# Patient Record
Sex: MALE | Race: WHITE | NOT HISPANIC OR LATINO | Employment: FULL TIME | URBAN - METROPOLITAN AREA
[De-identification: names, ages, dates, MRNs, and addresses within clinical notes are randomized per-mention and may not be internally consistent; named-entity substitution may affect disease eponyms.]

---

## 2023-12-22 ENCOUNTER — OFFICE VISIT (OUTPATIENT)
Dept: PRIMARY CARE | Facility: CLINIC | Age: 33
End: 2023-12-22
Payer: COMMERCIAL

## 2023-12-22 VITALS
BODY MASS INDEX: 33.32 KG/M2 | DIASTOLIC BLOOD PRESSURE: 88 MMHG | HEART RATE: 78 BPM | TEMPERATURE: 98.5 F | HEIGHT: 72 IN | WEIGHT: 246 LBS | SYSTOLIC BLOOD PRESSURE: 122 MMHG

## 2023-12-22 DIAGNOSIS — I27.0 PRIMARY PULMONARY HTN (MULTI): Primary | ICD-10-CM

## 2023-12-22 DIAGNOSIS — Z13.220 SCREENING FOR LIPID DISORDERS: ICD-10-CM

## 2023-12-22 PROCEDURE — 3008F BODY MASS INDEX DOCD: CPT | Performed by: INTERNAL MEDICINE

## 2023-12-22 PROCEDURE — 1036F TOBACCO NON-USER: CPT | Performed by: INTERNAL MEDICINE

## 2023-12-22 PROCEDURE — 99203 OFFICE O/P NEW LOW 30 MIN: CPT | Performed by: INTERNAL MEDICINE

## 2023-12-22 ASSESSMENT — PATIENT HEALTH QUESTIONNAIRE - PHQ9
2. FEELING DOWN, DEPRESSED OR HOPELESS: NOT AT ALL
SUM OF ALL RESPONSES TO PHQ9 QUESTIONS 1 & 2: 0
1. LITTLE INTEREST OR PLEASURE IN DOING THINGS: NOT AT ALL

## 2023-12-22 ASSESSMENT — ENCOUNTER SYMPTOMS
OCCASIONAL FEELINGS OF UNSTEADINESS: 0
DEPRESSION: 0
LOSS OF SENSATION IN FEET: 0

## 2023-12-22 NOTE — PROGRESS NOTES
Patient is seen for the first time to establish care.  His main concern is elevated blood pressure readings.  He informs that he was on amlodipine on for some time.  His blood pressure improved and he has stopped the amlodipine.    Past medical history:  1.  Hypertension  2.  Overweight.    Social history: He is a former smoker.  Stopped smoking 6 years ago before that he has smoked half pack a day for 40 years.  Also used nicotine vapor for some time which she has stopped 6 months ago.  He drinks occasionally.  He is  with 2 children.  He works as a  in the company.  He does not exercise regularly.    Family history: Positive for prostate cancer in father.  Mother is healthy.  There is no history of premature coronary artery disease    Review of other system: He denies any fever, chill, anorexia.  He has no visual disturbances.  Has few episodes of headache.  Denies any chest pain or palpitation.  Has no shortness of breath or wheezing.  Denies any nausea matting pain abdomen.  He has no urinary symptoms.  Has no numbness, tingling weakness of extremities.  Denies any joint swelling or pain.  He has no symptom of major depression.  Review of other systems are negative.    On examination:  General examination: There is no acute discomfort his BMI is 33.3 today  Eyes: There is no pallor or jaundice.  Pupils are equal and reactive to light  Oral cavity throat normal  Neck: There is no carotid bruits or JVD  Lungs: Clear to auscultation  CVS: Heart sounds are regular there is no gallop murmur  Abdomen: Normal exam  CNS: Normal power  Legs: No edema    Assessment and plan  1.  Hypertension: Blood pressure readings are good today lifestyle modifications including cutting down on salt and saturated fat is discussed with the patient encouraged to do regular exercise and lose some weight.  He is advised to check his blood pressure morning evening for about a week and call our office with the  readings.  Will consider in resuming medications if needed.  Goals of treatment are discussed with the patient  2.  Overweight: Goals of treatment discussed with the patient encouraged to cut down calorie intake and excise regularly  3.  Health maintenance: I will check his lipid profile.  He is average risk for colon cancer and is a screening will start at the appropriate age.  He does not want flu vaccination.  AAA I will bring him back in a few weeks time for further discussion after we have the results of blood test.

## 2024-01-12 PROBLEM — Z20.822 SUSPECTED SEVERE ACUTE RESPIRATORY SYNDROME CORONAVIRUS 2 (SARS-COV-2) INFECTION: Status: ACTIVE | Noted: 2024-01-12

## 2024-01-19 ENCOUNTER — LAB (OUTPATIENT)
Dept: LAB | Facility: LAB | Age: 34
End: 2024-01-19
Payer: COMMERCIAL

## 2024-01-19 ENCOUNTER — APPOINTMENT (OUTPATIENT)
Dept: PRIMARY CARE | Facility: CLINIC | Age: 34
End: 2024-01-19
Payer: COMMERCIAL

## 2024-01-19 DIAGNOSIS — Z13.220 SCREENING FOR LIPID DISORDERS: ICD-10-CM

## 2024-01-19 DIAGNOSIS — I27.0 PRIMARY PULMONARY HTN (MULTI): ICD-10-CM

## 2024-01-19 LAB
ALBUMIN SERPL BCP-MCNC: 4.9 G/DL (ref 3.4–5)
ALP SERPL-CCNC: 58 U/L (ref 33–120)
ALT SERPL W P-5'-P-CCNC: 56 U/L (ref 10–52)
ANION GAP SERPL CALC-SCNC: 14 MMOL/L (ref 10–20)
AST SERPL W P-5'-P-CCNC: 27 U/L (ref 9–39)
BASOPHILS # BLD AUTO: 0.04 X10*3/UL (ref 0–0.1)
BASOPHILS NFR BLD AUTO: 0.5 %
BILIRUB SERPL-MCNC: 0.5 MG/DL (ref 0–1.2)
BUN SERPL-MCNC: 15 MG/DL (ref 6–23)
CALCIUM SERPL-MCNC: 10 MG/DL (ref 8.6–10.3)
CHLORIDE SERPL-SCNC: 101 MMOL/L (ref 98–107)
CHOLEST SERPL-MCNC: 185 MG/DL (ref 0–199)
CHOLESTEROL/HDL RATIO: 4.2
CO2 SERPL-SCNC: 30 MMOL/L (ref 21–32)
CREAT SERPL-MCNC: 1 MG/DL (ref 0.5–1.3)
EGFRCR SERPLBLD CKD-EPI 2021: >90 ML/MIN/1.73M*2
EOSINOPHIL # BLD AUTO: 0.08 X10*3/UL (ref 0–0.7)
EOSINOPHIL NFR BLD AUTO: 1 %
ERYTHROCYTE [DISTWIDTH] IN BLOOD BY AUTOMATED COUNT: 13.2 % (ref 11.5–14.5)
GLUCOSE SERPL-MCNC: 101 MG/DL (ref 74–99)
HCT VFR BLD AUTO: 48.4 % (ref 41–52)
HDLC SERPL-MCNC: 43.8 MG/DL
HGB BLD-MCNC: 16.6 G/DL (ref 13.5–17.5)
IMM GRANULOCYTES # BLD AUTO: 0.02 X10*3/UL (ref 0–0.7)
IMM GRANULOCYTES NFR BLD AUTO: 0.3 % (ref 0–0.9)
LDLC SERPL CALC-MCNC: 114 MG/DL
LYMPHOCYTES # BLD AUTO: 1.24 X10*3/UL (ref 1.2–4.8)
LYMPHOCYTES NFR BLD AUTO: 15.9 %
MCH RBC QN AUTO: 29.9 PG (ref 26–34)
MCHC RBC AUTO-ENTMCNC: 34.3 G/DL (ref 32–36)
MCV RBC AUTO: 87 FL (ref 80–100)
MONOCYTES # BLD AUTO: 0.48 X10*3/UL (ref 0.1–1)
MONOCYTES NFR BLD AUTO: 6.2 %
NEUTROPHILS # BLD AUTO: 5.93 X10*3/UL (ref 1.2–7.7)
NEUTROPHILS NFR BLD AUTO: 76.1 %
NON HDL CHOLESTEROL: 141 MG/DL (ref 0–149)
NRBC BLD-RTO: 0 /100 WBCS (ref 0–0)
PLATELET # BLD AUTO: 228 X10*3/UL (ref 150–450)
POTASSIUM SERPL-SCNC: 4.5 MMOL/L (ref 3.5–5.3)
PROT SERPL-MCNC: 7.5 G/DL (ref 6.4–8.2)
RBC # BLD AUTO: 5.55 X10*6/UL (ref 4.5–5.9)
SODIUM SERPL-SCNC: 140 MMOL/L (ref 136–145)
TRIGL SERPL-MCNC: 137 MG/DL (ref 0–149)
VLDL: 27 MG/DL (ref 0–40)
WBC # BLD AUTO: 7.8 X10*3/UL (ref 4.4–11.3)

## 2024-01-19 PROCEDURE — 80061 LIPID PANEL: CPT

## 2024-01-19 PROCEDURE — 80053 COMPREHEN METABOLIC PANEL: CPT

## 2024-01-19 PROCEDURE — 36415 COLL VENOUS BLD VENIPUNCTURE: CPT

## 2024-01-19 PROCEDURE — 85025 COMPLETE CBC W/AUTO DIFF WBC: CPT

## 2024-01-22 ENCOUNTER — TELEPHONE (OUTPATIENT)
Dept: PRIMARY CARE | Facility: CLINIC | Age: 34
End: 2024-01-22
Payer: COMMERCIAL

## 2024-01-22 NOTE — TELEPHONE ENCOUNTER
Left pt a message   ----- Message from Niyah Richards MD sent at 1/19/2024  4:40 PM EST -----  Cholesterol level is high.  Liver function test is abnormal.  Needs to be seen to discuss treatment options  ----- Message -----  From: Lab, Background User  Sent: 1/19/2024   1:28 PM EST  To: Niyah Richards MD

## 2024-01-22 NOTE — TELEPHONE ENCOUNTER
Left message for pt.     ----- Message from Niyah Richards MD sent at 1/19/2024  4:40 PM EST -----  Cholesterol level is high.  Liver function test is abnormal.  Needs to be seen to discuss treatment options  ----- Message -----  From: Lab, Background User  Sent: 1/19/2024   1:28 PM EST  To: Niyah Richards MD

## 2024-01-26 ENCOUNTER — OFFICE VISIT (OUTPATIENT)
Dept: PRIMARY CARE | Facility: CLINIC | Age: 34
End: 2024-01-26
Payer: COMMERCIAL

## 2024-01-26 VITALS
HEART RATE: 89 BPM | SYSTOLIC BLOOD PRESSURE: 150 MMHG | DIASTOLIC BLOOD PRESSURE: 90 MMHG | OXYGEN SATURATION: 97 % | WEIGHT: 251 LBS | TEMPERATURE: 98.2 F | BODY MASS INDEX: 34 KG/M2 | HEIGHT: 72 IN

## 2024-01-26 DIAGNOSIS — R73.9 HYPERGLYCEMIA: ICD-10-CM

## 2024-01-26 DIAGNOSIS — J01.01 ACUTE RECURRENT MAXILLARY SINUSITIS: Primary | ICD-10-CM

## 2024-01-26 DIAGNOSIS — I10 PRIMARY HYPERTENSION: ICD-10-CM

## 2024-01-26 DIAGNOSIS — R05.1 ACUTE COUGH: ICD-10-CM

## 2024-01-26 DIAGNOSIS — E78.00 PURE HYPERCHOLESTEROLEMIA: ICD-10-CM

## 2024-01-26 PROCEDURE — 1036F TOBACCO NON-USER: CPT | Performed by: INTERNAL MEDICINE

## 2024-01-26 PROCEDURE — 3008F BODY MASS INDEX DOCD: CPT | Performed by: INTERNAL MEDICINE

## 2024-01-26 PROCEDURE — 3077F SYST BP >= 140 MM HG: CPT | Performed by: INTERNAL MEDICINE

## 2024-01-26 PROCEDURE — 3080F DIAST BP >= 90 MM HG: CPT | Performed by: INTERNAL MEDICINE

## 2024-01-26 PROCEDURE — 99214 OFFICE O/P EST MOD 30 MIN: CPT | Performed by: INTERNAL MEDICINE

## 2024-01-26 RX ORDER — AMLODIPINE BESYLATE 5 MG/1
5 TABLET ORAL DAILY
Qty: 90 TABLET | Refills: 1 | Status: SHIPPED | OUTPATIENT
Start: 2024-01-26 | End: 2024-07-24

## 2024-01-26 NOTE — PROGRESS NOTES
Patient is seen my office today with chief complaint of cough congestion some nasal drainage for the last 5 days.  Symptoms are slightly better today he has no fever or chills.  Denies any shortness of breath or wheezing.  Has no chest pain or palpitation.  Patient has been monitoring his blood pressure at home most of the systolic readings are around 140.  He was on lifestyle modification for the hypertension recently.  Previously he was on amlodipine tablet.  He is also here to discuss the result of blood test which did show patient has a borderline hyperglycemia and hyperlipidemia.  Denies any headache or visual disturbances.  Has no weakness of any extremity.  Has no polyuria, polydipsia any other symptom uncontrolled hyperglycemia.  Review of other systems are negative.    On examination:  General examination: Overweight  Eyes: There is no pallor or jaundice pupils are equal and reactive to light  Oral cavity throat normal  Nose: Nasal mucosa is congested.  Ears: Normal exam  Neck: There is no lymphadenopathy or thyroid enlargement  Lungs: Clear to auscultation  CVS: Heart sounds are regular there is no gallop murmur  Legs: No edema    Assessment and plan  1.  Hypertension: Goals of treatment are discussed with the patient and mutually agreed to start him on amlodipine 5 mg daily.  Patient will monitor blood pressure at home and call our office if the readings are not controlled  2.  Acute upper respiratory infection: Possibly viral mediated no office test for COVID-19 which is negative.  Patient is advised to take symptomatic medications as needed  3.  Hyperglycemia: Advised to avoid sugar and sweets in the diet  4.  Hyperlipidemia: LDL is 114.  Goals of treatment discussed with the patient patient will continue with a low-fat diet exercise and have a repeat lipid panel in 4 to 6 months  5.  Obesity encouraged to cut down calorie intake and exercise regularly.  Patient is will be seen in the office in 4  months.

## 2024-05-10 PROBLEM — R05.1 ACUTE COUGH: Status: ACTIVE | Noted: 2024-05-10

## 2024-05-10 PROBLEM — E66.9 OBESITY WITH BODY MASS INDEX 30 OR GREATER: Status: ACTIVE | Noted: 2024-05-10

## 2024-05-10 PROBLEM — J01.01 ACUTE RECURRENT MAXILLARY SINUSITIS: Status: ACTIVE | Noted: 2024-05-10

## 2024-05-10 PROBLEM — I27.0 IDIOPATHIC PULMONARY HYPERTENSIVE ARTERIAL DISEASE (MULTI): Status: ACTIVE | Noted: 2024-05-10

## 2024-05-10 PROBLEM — I10 PRIMARY HYPERTENSION: Status: ACTIVE | Noted: 2024-05-10

## 2024-05-10 PROBLEM — R73.9 HYPERGLYCEMIA: Status: ACTIVE | Noted: 2024-05-10

## 2024-05-10 PROBLEM — E78.00 PURE HYPERCHOLESTEROLEMIA: Status: ACTIVE | Noted: 2024-05-10

## 2024-05-17 ENCOUNTER — TELEPHONE (OUTPATIENT)
Dept: PRIMARY CARE | Facility: CLINIC | Age: 34
End: 2024-05-17

## 2024-05-17 ENCOUNTER — LAB (OUTPATIENT)
Dept: LAB | Facility: LAB | Age: 34
End: 2024-05-17
Payer: COMMERCIAL

## 2024-05-17 DIAGNOSIS — J01.01 ACUTE RECURRENT MAXILLARY SINUSITIS: ICD-10-CM

## 2024-05-17 DIAGNOSIS — E78.00 PURE HYPERCHOLESTEROLEMIA: ICD-10-CM

## 2024-05-17 DIAGNOSIS — R73.9 HYPERGLYCEMIA: ICD-10-CM

## 2024-05-17 LAB
ALBUMIN SERPL BCP-MCNC: 4.6 G/DL (ref 3.4–5)
ALP SERPL-CCNC: 60 U/L (ref 33–120)
ALT SERPL W P-5'-P-CCNC: 35 U/L (ref 10–52)
ANION GAP SERPL CALC-SCNC: 13 MMOL/L (ref 10–20)
AST SERPL W P-5'-P-CCNC: 25 U/L (ref 9–39)
BILIRUB SERPL-MCNC: 0.7 MG/DL (ref 0–1.2)
BUN SERPL-MCNC: 17 MG/DL (ref 6–23)
CALCIUM SERPL-MCNC: 9.5 MG/DL (ref 8.6–10.3)
CHLORIDE SERPL-SCNC: 104 MMOL/L (ref 98–107)
CHOLEST SERPL-MCNC: 156 MG/DL (ref 0–199)
CHOLESTEROL/HDL RATIO: 4.1
CO2 SERPL-SCNC: 26 MMOL/L (ref 21–32)
CREAT SERPL-MCNC: 0.93 MG/DL (ref 0.5–1.3)
EGFRCR SERPLBLD CKD-EPI 2021: >90 ML/MIN/1.73M*2
EST. AVERAGE GLUCOSE BLD GHB EST-MCNC: 103 MG/DL
GLUCOSE SERPL-MCNC: 95 MG/DL (ref 74–99)
HBA1C MFR BLD: 5.2 %
HDLC SERPL-MCNC: 38 MG/DL
LDLC SERPL CALC-MCNC: 98 MG/DL
NON HDL CHOLESTEROL: 118 MG/DL (ref 0–149)
POTASSIUM SERPL-SCNC: 4.1 MMOL/L (ref 3.5–5.3)
PROT SERPL-MCNC: 6.7 G/DL (ref 6.4–8.2)
SODIUM SERPL-SCNC: 139 MMOL/L (ref 136–145)
TRIGL SERPL-MCNC: 102 MG/DL (ref 0–149)
VLDL: 20 MG/DL (ref 0–40)

## 2024-05-17 PROCEDURE — 83036 HEMOGLOBIN GLYCOSYLATED A1C: CPT

## 2024-05-17 PROCEDURE — 80061 LIPID PANEL: CPT

## 2024-05-17 PROCEDURE — 36415 COLL VENOUS BLD VENIPUNCTURE: CPT

## 2024-05-17 PROCEDURE — 80053 COMPREHEN METABOLIC PANEL: CPT

## 2024-05-17 NOTE — TELEPHONE ENCOUNTER
Pt is aware of his results and will be in for his appt.     ----- Message from Niyah Richards MD sent at 5/17/2024 11:59 AM EDT -----  Liver function test and cholesterol has improved.  I will discuss in detail during office visit  ----- Message -----  From: Lab, Background User  Sent: 5/17/2024  11:36 AM EDT  To: Niyah Richards MD

## 2024-05-24 ENCOUNTER — OFFICE VISIT (OUTPATIENT)
Dept: PRIMARY CARE | Facility: CLINIC | Age: 34
End: 2024-05-24
Payer: COMMERCIAL

## 2024-05-24 VITALS
WEIGHT: 242 LBS | SYSTOLIC BLOOD PRESSURE: 124 MMHG | OXYGEN SATURATION: 97 % | HEIGHT: 72 IN | HEART RATE: 57 BPM | DIASTOLIC BLOOD PRESSURE: 80 MMHG | BODY MASS INDEX: 32.78 KG/M2

## 2024-05-24 DIAGNOSIS — E78.00 PURE HYPERCHOLESTEROLEMIA: Primary | ICD-10-CM

## 2024-05-24 DIAGNOSIS — I10 PRIMARY HYPERTENSION: ICD-10-CM

## 2024-05-24 DIAGNOSIS — R73.9 HYPERGLYCEMIA: ICD-10-CM

## 2024-05-24 PROCEDURE — 99213 OFFICE O/P EST LOW 20 MIN: CPT | Performed by: INTERNAL MEDICINE

## 2024-05-24 PROCEDURE — 3079F DIAST BP 80-89 MM HG: CPT | Performed by: INTERNAL MEDICINE

## 2024-05-24 PROCEDURE — 3074F SYST BP LT 130 MM HG: CPT | Performed by: INTERNAL MEDICINE

## 2024-05-24 PROCEDURE — 1036F TOBACCO NON-USER: CPT | Performed by: INTERNAL MEDICINE

## 2024-05-24 PROCEDURE — 3008F BODY MASS INDEX DOCD: CPT | Performed by: INTERNAL MEDICINE

## 2024-05-24 ASSESSMENT — ENCOUNTER SYMPTOMS
OCCASIONAL FEELINGS OF UNSTEADINESS: 0
DEPRESSION: 0
LOSS OF SENSATION IN FEET: 0

## 2024-05-24 NOTE — PROGRESS NOTES
Internal Medicine Outpatient Visit  Chief Complaint   Patient presents with    Follow-up     4 to 6 month follow up        HPI: Andrés Vogel is of 33 y.o. who is here for Internal Visit for the following issues:  Mr. Motta is seen in my office today for follow-up of his medical problems including hypertension, hyperlipidemia, hyperglycemia, obesity.  He also wants to discuss the result of blood test which was done a week ago.  He denies any fever chill anorexia.  Has no respiratory symptoms.  Denies any chest pain or palpitation.  No recent change in bowel habit.  Has no joint swelling or pain.  Review of other systems are negative.    Past Surgical History:   Procedure Laterality Date    OTHER SURGICAL HISTORY  11/16/2020    No history of surgery       No family history on file.      Current Outpatient Medications on File Prior to Visit   Medication Sig Dispense Refill    amLODIPine (Norvasc) 5 mg tablet Take 1 tablet (5 mg) by mouth once daily. 90 tablet 1     No current facility-administered medications on file prior to visit.       Blood pressure 124/80, pulse 57, height 1.829 m (6'), weight 110 kg (242 lb), SpO2 97%.  Body mass index is 32.82 kg/m².  General examination: BMI is 32.8  Eyes: There is no pallor or jaundice  Neck: There is no JVD or thyroid enlargement  Lungs: Clear to auscultation  CVS: Heart sounds are regular there is no gallop murmur  Legs: No edema    1. Pure hypercholesterolemia  Patient is watching his diet he does try to exercise regularly.  LDL has come down to 98 from previous level of 114.  - CBC and Auto Differential; Future  - Comprehensive metabolic panel; Future  - Lipid panel; Future    2. Hyperglycemia  Hemoglobin's A1c level this month is 5.2  - CBC and Auto Differential; Future  - Comprehensive metabolic panel; Future    3. Primary hypertension  Continued on the current dose of amlodipine  - CBC and Auto Differential; Future  - Comprehensive metabolic panel; Future    4.  BMI 32.0-32.9,adult  Patient is watching his calorie intake and exercising.  He has managed to lose some weight.  - CBC and Auto Differential; Future    He will be seen in my office in 4 to 6 months

## 2024-07-29 DIAGNOSIS — I10 PRIMARY HYPERTENSION: ICD-10-CM

## 2024-07-29 RX ORDER — AMLODIPINE BESYLATE 5 MG/1
5 TABLET ORAL DAILY
Qty: 30 TABLET | Refills: 0 | Status: SHIPPED | OUTPATIENT
Start: 2024-07-29

## 2024-08-12 DIAGNOSIS — I10 PRIMARY HYPERTENSION: ICD-10-CM

## 2024-08-12 RX ORDER — AMLODIPINE BESYLATE 5 MG/1
5 TABLET ORAL DAILY
Qty: 90 TABLET | Refills: 1 | Status: SHIPPED | OUTPATIENT
Start: 2024-08-12

## 2024-11-01 ENCOUNTER — APPOINTMENT (OUTPATIENT)
Dept: PRIMARY CARE | Facility: CLINIC | Age: 34
End: 2024-11-01
Payer: COMMERCIAL

## 2025-03-12 DIAGNOSIS — I10 PRIMARY HYPERTENSION: ICD-10-CM

## 2025-03-12 RX ORDER — AMLODIPINE BESYLATE 5 MG/1
5 TABLET ORAL DAILY
Qty: 30 TABLET | Refills: 0 | Status: SHIPPED | OUTPATIENT
Start: 2025-03-12

## 2025-04-09 ENCOUNTER — APPOINTMENT (OUTPATIENT)
Dept: PRIMARY CARE | Facility: CLINIC | Age: 35
End: 2025-04-09
Payer: COMMERCIAL

## 2025-04-13 ENCOUNTER — OFFICE VISIT (OUTPATIENT)
Dept: URGENT CARE | Age: 35
End: 2025-04-13
Payer: COMMERCIAL

## 2025-04-13 VITALS
HEART RATE: 89 BPM | HEIGHT: 72 IN | WEIGHT: 230 LBS | SYSTOLIC BLOOD PRESSURE: 144 MMHG | RESPIRATION RATE: 16 BRPM | TEMPERATURE: 98.2 F | OXYGEN SATURATION: 98 % | DIASTOLIC BLOOD PRESSURE: 90 MMHG | BODY MASS INDEX: 31.15 KG/M2

## 2025-04-13 DIAGNOSIS — J01.90 ACUTE SINUSITIS, RECURRENCE NOT SPECIFIED, UNSPECIFIED LOCATION: Primary | ICD-10-CM

## 2025-04-13 PROCEDURE — 3080F DIAST BP >= 90 MM HG: CPT | Performed by: NURSE PRACTITIONER

## 2025-04-13 PROCEDURE — 99203 OFFICE O/P NEW LOW 30 MIN: CPT | Performed by: NURSE PRACTITIONER

## 2025-04-13 PROCEDURE — 3008F BODY MASS INDEX DOCD: CPT | Performed by: NURSE PRACTITIONER

## 2025-04-13 PROCEDURE — 1036F TOBACCO NON-USER: CPT | Performed by: NURSE PRACTITIONER

## 2025-04-13 PROCEDURE — 3077F SYST BP >= 140 MM HG: CPT | Performed by: NURSE PRACTITIONER

## 2025-04-13 RX ORDER — AMOXICILLIN AND CLAVULANATE POTASSIUM 875; 125 MG/1; MG/1
875 TABLET, FILM COATED ORAL 2 TIMES DAILY
Qty: 20 TABLET | Refills: 0 | Status: SHIPPED | OUTPATIENT
Start: 2025-04-13

## 2025-04-13 NOTE — PROGRESS NOTES
Subjective   Patient ID: Andrés Vogel is a 34 y.o. male. They present today with a chief complaint of Congestion, sinus pain/pressure (Yellow mucus, x1.5 wk).    History of Present Illness  Patient presents with completive congestion, sinus pressure, sinus pain, and yellow mucus for approximately 1 1/2 weeks. He is using Afrin which states it does help. Last night he tried Delsym. Does not feel like the cough is keeping him awake, but feels like it is draining into his chest. Denies wheezing and shortness of breath.     Past Medical History  Allergies as of 04/13/2025    (No Known Allergies)       (Not in a hospital admission)       Past Medical History:   Diagnosis Date    Other specified health status 11/16/2020    No pertinent past medical history       Past Surgical History:   Procedure Laterality Date    OTHER SURGICAL HISTORY  11/16/2020    No history of surgery        reports that he has quit smoking. His smoking use included cigarettes. He has been exposed to tobacco smoke. He has never used smokeless tobacco. He reports current alcohol use. He reports that he does not use drugs.    Review of Systems  Review of Systems     See HPI                          Objective    There were no vitals filed for this visit.  No LMP for male patient.    Physical Exam  CONSTITUTIONAL: The general appearance and condition of the patient were examined.  Level of distress, nutrition, external development abnormality, and general behavior were noted.  No abnormal findings.  Vital signs as documented.      ENT: External ears: left ear normal ; right ear normal. Bilateral ears have bulging TM's.  Normal external ear exam.  Bilateral swelling and redness to nasal turbinate's.  Erythema with pebbling to throat.  Sinus pain/pressure on palpation.    CARDIOVASCULAR: The patient's heart examined for regular rate and rhythm and presence of murmurs. Note taken of any tachycardia, bradycardia or any irregular rhythm.  No abnormal  findings.        RESPIRATORY/LUNGS: Chest examined for equal movement, bilaterally.  Lungs examined for equality of breath sounds. Presence or absence of rales noted bilaterally.  Examined for the presence of diffuse or scattered wheezes.  No abnormal findings.        Procedures    Point of Care Test & Imaging Results from this visit  No results found for this visit on 04/13/25.   Imaging  No results found.    Cardiology, Vascular, and Other Imaging  No other imaging results found for the past 2 days      Diagnostic study results (if any) were reviewed by [facility].    Assessment/Plan   Allergies, medications, history, and pertinent labs/EKGs/Imaging reviewed by Dana Donald, SOSA-CNP.     Medical Decision Making  Supportive care - encourage clear fluids ( water, Pedialyte, ) , chicken broth/soup and warm fluids can be soothing as well.  Rest, adjust room temperature and humidity.  Use saline spray/drops as needed.  Tylenol or Motrin if needed for fever.   Follow up with PCP if you are not feeling any better.    At time of discharge patient was clinically well-appearing and HDS for outpatient management. The patient and/or family was educated regarding diagnosis, supportive care, OTC and Rx medications. The patient and/or family was given the opportunity to ask questions prior to discharge.  They verbalized understanding of my discussion of the plans for treatment, expected course, indications to return to  or seek further evaluation in ED, and the need for timely follow up as directed.   They were provided with a work/school excuse if requested.      Orders and Diagnoses  There are no diagnoses linked to this encounter.    Medical Admin Record      Patient disposition: Home    Electronically signed by [facility]  10:28 AM

## 2025-04-22 ENCOUNTER — APPOINTMENT (OUTPATIENT)
Dept: PRIMARY CARE | Facility: CLINIC | Age: 35
End: 2025-04-22
Payer: COMMERCIAL

## 2025-04-22 VITALS
OXYGEN SATURATION: 97 % | HEART RATE: 75 BPM | HEIGHT: 72 IN | BODY MASS INDEX: 30.31 KG/M2 | SYSTOLIC BLOOD PRESSURE: 130 MMHG | WEIGHT: 223.8 LBS | DIASTOLIC BLOOD PRESSURE: 80 MMHG

## 2025-04-22 DIAGNOSIS — R73.9 HYPERGLYCEMIA: ICD-10-CM

## 2025-04-22 DIAGNOSIS — E66.811 OBESITY, CLASS 1: ICD-10-CM

## 2025-04-22 DIAGNOSIS — Z00.00 WELLNESS EXAMINATION: Primary | ICD-10-CM

## 2025-04-22 DIAGNOSIS — E78.00 PURE HYPERCHOLESTEROLEMIA: ICD-10-CM

## 2025-04-22 DIAGNOSIS — I10 PRIMARY HYPERTENSION: ICD-10-CM

## 2025-04-22 PROCEDURE — 99395 PREV VISIT EST AGE 18-39: CPT | Performed by: INTERNAL MEDICINE

## 2025-04-22 PROCEDURE — 3079F DIAST BP 80-89 MM HG: CPT | Performed by: INTERNAL MEDICINE

## 2025-04-22 PROCEDURE — 3075F SYST BP GE 130 - 139MM HG: CPT | Performed by: INTERNAL MEDICINE

## 2025-04-22 PROCEDURE — 1036F TOBACCO NON-USER: CPT | Performed by: INTERNAL MEDICINE

## 2025-04-22 PROCEDURE — 3008F BODY MASS INDEX DOCD: CPT | Performed by: INTERNAL MEDICINE

## 2025-04-22 RX ORDER — AMLODIPINE BESYLATE 5 MG/1
5 TABLET ORAL DAILY
Qty: 90 TABLET | Refills: 1 | Status: SHIPPED | OUTPATIENT
Start: 2025-04-22

## 2025-04-22 ASSESSMENT — PATIENT HEALTH QUESTIONNAIRE - PHQ9
SUM OF ALL RESPONSES TO PHQ9 QUESTIONS 1 & 2: 0
1. LITTLE INTEREST OR PLEASURE IN DOING THINGS: NOT AT ALL
SUM OF ALL RESPONSES TO PHQ9 QUESTIONS 1 AND 2: 0
2. FEELING DOWN, DEPRESSED OR HOPELESS: NOT AT ALL
1. LITTLE INTEREST OR PLEASURE IN DOING THINGS: NOT AT ALL
2. FEELING DOWN, DEPRESSED OR HOPELESS: NOT AT ALL

## 2025-04-22 ASSESSMENT — ENCOUNTER SYMPTOMS
LOSS OF SENSATION IN FEET: 0
DEPRESSION: 0
OCCASIONAL FEELINGS OF UNSTEADINESS: 0

## 2025-04-22 NOTE — PROGRESS NOTES
Internal Medicine Outpatient Visit  Chief Complaint   Patient presents with    Annual Exam     Physical        HPI: Andrés Vogel is of 34 y.o. who is here for Internal Visit for the following issues:  Patient is seen office today after long interval for wellness examination and follow-up of his medical problems including hypertension, hyperlipidemia, hyperglycemia, obesity and other medical problems.  He also needs a refill on his prescription.  He denies any fever chill Anexsia.  Has no headache or visual disturbances.  Has no chest pain or palpitation.  Has no shortness of breath or wheezing.  Denies any recent change in bowel habit.  He has no weakness of any extremity.  Denies any joint swelling or pain.  He has no polyuria, polydipsia any other symptom uncontrolled hyperglycemia.  Patient has managed to lose significant weight.  He was preparing for a marathon.  Review of other systems is negative.    Surgical History[1]    Family History[2]      Medications Ordered Prior to Encounter[3]    Blood pressure 130/80, pulse 75, height 1.829 m (6'), weight 102 kg (223 lb 12.8 oz), SpO2 97%.  Body mass index is 30.35 kg/m².  General examination: BMI is 30.3  Eyes: There is no pallor or jaundice pupils are equal and reactive to light  Neck: There is no lymphadenopathy or JVD  Lungs: Breath sounds are normal  CVS: Rhythm is regular there is no gallop murmur  Abdomen: Soft there is no organomegaly or tenderness  CNS: Normal power  Musculoskeletal system there is no joint swelling  Legs: No edema  Skin: No rash    Assessment and plan:    1. Wellness examination (Primary)  Physical examination is normal except being overweight.  Patient is already exercising and watching his diet.  He has significantly cut down on drinks and had managed to lose weight.  He is average risk for colorectal cancer screening as screening will start at appropriate age.  Patient is due for several vaccinations including tetanus vaccination  MMR and hepatitis B vaccines.  Patient declined all the vaccinations today.  Patient also declines to be screened for HIV or hepatitis C.  Routine blood testing is ordered.    2. Pure hypercholesterolemia  Check lipid panel again.    3. Primary hypertension  Prescription for amlodipine is renewed.    4. Hyperglycemia  I will check fasting blood sugar and hemoglobin A1c.    5. Obesity, class 1  Patient is already working on diet and exercise.  He has managed to lose weight.  Advised to continue with the current regimen.  He will be seen in office in 6 months, earlier if needed depending on the result of the test ordered today.                         [1]   Past Surgical History:  Procedure Laterality Date    OTHER SURGICAL HISTORY  11/16/2020    No history of surgery   [2] No family history on file.  [3]   Current Outpatient Medications on File Prior to Visit   Medication Sig Dispense Refill    amLODIPine (Norvasc) 5 mg tablet Take 1 tablet (5 mg) by mouth once daily. 30 tablet 0    amoxicillin-pot clavulanate (Augmentin) 875-125 mg tablet Take 1 tablet (875 mg) by mouth 2 times a day. 20 tablet 0     No current facility-administered medications on file prior to visit.

## 2025-10-28 ENCOUNTER — APPOINTMENT (OUTPATIENT)
Dept: PRIMARY CARE | Facility: CLINIC | Age: 35
End: 2025-10-28
Payer: COMMERCIAL